# Patient Record
Sex: MALE | Race: BLACK OR AFRICAN AMERICAN | Employment: OTHER | URBAN - METROPOLITAN AREA
[De-identification: names, ages, dates, MRNs, and addresses within clinical notes are randomized per-mention and may not be internally consistent; named-entity substitution may affect disease eponyms.]

---

## 2021-11-27 ENCOUNTER — APPOINTMENT (OUTPATIENT)
Dept: GENERAL RADIOLOGY | Age: 71
DRG: 305 | End: 2021-11-27
Attending: EMERGENCY MEDICINE
Payer: OTHER GOVERNMENT

## 2021-11-27 ENCOUNTER — HOSPITAL ENCOUNTER (INPATIENT)
Age: 71
LOS: 1 days | Discharge: HOME | DRG: 305 | End: 2021-11-28
Attending: EMERGENCY MEDICINE | Admitting: INTERNAL MEDICINE
Payer: OTHER GOVERNMENT

## 2021-11-27 DIAGNOSIS — I16.0 HYPERTENSIVE URGENCY: Primary | ICD-10-CM

## 2021-11-27 DIAGNOSIS — J81.0 ACUTE PULMONARY EDEMA (HCC): ICD-10-CM

## 2021-11-27 PROBLEM — I10 HYPERTENSION: Chronic | Status: ACTIVE | Noted: 2021-11-27

## 2021-11-27 PROBLEM — I16.1 HYPERTENSIVE EMERGENCY: Status: ACTIVE | Noted: 2021-11-27

## 2021-11-27 PROBLEM — E87.70 VOLUME OVERLOAD: Status: ACTIVE | Noted: 2021-11-27

## 2021-11-27 PROBLEM — Z91.199 NONCOMPLIANCE: Chronic | Status: ACTIVE | Noted: 2021-11-27

## 2021-11-27 PROBLEM — F41.9 ANXIETY: Chronic | Status: ACTIVE | Noted: 2021-11-27

## 2021-11-27 PROBLEM — F43.10 PTSD (POST-TRAUMATIC STRESS DISORDER): Chronic | Status: ACTIVE | Noted: 2021-11-27

## 2021-11-27 LAB
ALBUMIN SERPL-MCNC: 3.8 G/DL (ref 3.2–4.6)
ALBUMIN/GLOB SERPL: 1 {RATIO} (ref 1.2–3.5)
ALP SERPL-CCNC: 74 U/L (ref 50–136)
ALT SERPL-CCNC: 37 U/L (ref 12–65)
AMPHET UR QL SCN: NEGATIVE
ANION GAP SERPL CALC-SCNC: 6 MMOL/L (ref 7–16)
APPEARANCE UR: CLEAR
AST SERPL-CCNC: 72 U/L (ref 15–37)
BARBITURATES UR QL SCN: NEGATIVE
BASOPHILS # BLD: 0.1 K/UL (ref 0–0.2)
BASOPHILS NFR BLD: 1 % (ref 0–2)
BENZODIAZ UR QL: NEGATIVE
BILIRUB SERPL-MCNC: 2.2 MG/DL (ref 0.2–1.1)
BILIRUB UR QL: NEGATIVE
BNP SERPL-MCNC: 2790 PG/ML (ref 5–125)
BUN SERPL-MCNC: 26 MG/DL (ref 8–23)
CALCIUM SERPL-MCNC: 9 MG/DL (ref 8.3–10.4)
CANNABINOIDS UR QL SCN: NEGATIVE
CHLORIDE SERPL-SCNC: 110 MMOL/L (ref 98–107)
CO2 SERPL-SCNC: 28 MMOL/L (ref 21–32)
COCAINE UR QL SCN: NEGATIVE
COLOR UR: YELLOW
COVID-19 RAPID TEST, COVR: NOT DETECTED
CREAT SERPL-MCNC: 0.93 MG/DL (ref 0.8–1.5)
DIFFERENTIAL METHOD BLD: ABNORMAL
EOSINOPHIL # BLD: 0.2 K/UL (ref 0–0.8)
EOSINOPHIL NFR BLD: 2 % (ref 0.5–7.8)
ERYTHROCYTE [DISTWIDTH] IN BLOOD BY AUTOMATED COUNT: 14.7 % (ref 11.9–14.6)
GLOBULIN SER CALC-MCNC: 3.9 G/DL (ref 2.3–3.5)
GLUCOSE SERPL-MCNC: 104 MG/DL (ref 65–100)
GLUCOSE UR STRIP.AUTO-MCNC: NEGATIVE MG/DL
HCT VFR BLD AUTO: 42.6 % (ref 41.1–50.3)
HGB BLD-MCNC: 14.1 G/DL (ref 13.6–17.2)
HGB UR QL STRIP: NEGATIVE
IMM GRANULOCYTES # BLD AUTO: 0 K/UL (ref 0–0.5)
IMM GRANULOCYTES NFR BLD AUTO: 0 % (ref 0–5)
KETONES UR QL STRIP.AUTO: NEGATIVE MG/DL
LEUKOCYTE ESTERASE UR QL STRIP.AUTO: NEGATIVE
LYMPHOCYTES # BLD: 1.9 K/UL (ref 0.5–4.6)
LYMPHOCYTES NFR BLD: 17 % (ref 13–44)
MAGNESIUM SERPL-MCNC: 1.9 MG/DL (ref 1.8–2.4)
MCH RBC QN AUTO: 29.3 PG (ref 26.1–32.9)
MCHC RBC AUTO-ENTMCNC: 33.1 G/DL (ref 31.4–35)
MCV RBC AUTO: 88.4 FL (ref 79.6–97.8)
METHADONE UR QL: NEGATIVE
MONOCYTES # BLD: 1.2 K/UL (ref 0.1–1.3)
MONOCYTES NFR BLD: 10 % (ref 4–12)
NEUTS SEG # BLD: 8.3 K/UL (ref 1.7–8.2)
NEUTS SEG NFR BLD: 71 % (ref 43–78)
NITRITE UR QL STRIP.AUTO: NEGATIVE
NRBC # BLD: 0 K/UL (ref 0–0.2)
OPIATES UR QL: NEGATIVE
PCP UR QL: NEGATIVE
PH UR STRIP: 6 [PH] (ref 5–9)
PLATELET # BLD AUTO: 213 K/UL (ref 150–450)
PMV BLD AUTO: 11.7 FL (ref 9.4–12.3)
POTASSIUM SERPL-SCNC: 4.1 MMOL/L (ref 3.5–5.1)
PROT SERPL-MCNC: 7.7 G/DL (ref 6.3–8.2)
PROT UR STRIP-MCNC: NEGATIVE MG/DL
RBC # BLD AUTO: 4.82 M/UL (ref 4.23–5.6)
SODIUM SERPL-SCNC: 144 MMOL/L (ref 136–145)
SOURCE, COVRS: NORMAL
SP GR UR REFRACTOMETRY: 1.01 (ref 1–1.02)
UROBILINOGEN UR QL STRIP.AUTO: 1 EU/DL (ref 0.2–1)
WBC # BLD AUTO: 11.7 K/UL (ref 4.3–11.1)

## 2021-11-27 PROCEDURE — 71045 X-RAY EXAM CHEST 1 VIEW: CPT

## 2021-11-27 PROCEDURE — 74011250637 HC RX REV CODE- 250/637: Performed by: INTERNAL MEDICINE

## 2021-11-27 PROCEDURE — 85025 COMPLETE CBC W/AUTO DIFF WBC: CPT

## 2021-11-27 PROCEDURE — 87635 SARS-COV-2 COVID-19 AMP PRB: CPT

## 2021-11-27 PROCEDURE — 81003 URINALYSIS AUTO W/O SCOPE: CPT

## 2021-11-27 PROCEDURE — 80053 COMPREHEN METABOLIC PANEL: CPT

## 2021-11-27 PROCEDURE — 80307 DRUG TEST PRSMV CHEM ANLYZR: CPT

## 2021-11-27 PROCEDURE — 74011250636 HC RX REV CODE- 250/636: Performed by: INTERNAL MEDICINE

## 2021-11-27 PROCEDURE — 99285 EMERGENCY DEPT VISIT HI MDM: CPT

## 2021-11-27 PROCEDURE — 94762 N-INVAS EAR/PLS OXIMTRY CONT: CPT

## 2021-11-27 PROCEDURE — 83880 ASSAY OF NATRIURETIC PEPTIDE: CPT

## 2021-11-27 PROCEDURE — 94664 DEMO&/EVAL PT USE INHALER: CPT

## 2021-11-27 PROCEDURE — 74011000250 HC RX REV CODE- 250: Performed by: INTERNAL MEDICINE

## 2021-11-27 PROCEDURE — 93005 ELECTROCARDIOGRAM TRACING: CPT | Performed by: EMERGENCY MEDICINE

## 2021-11-27 PROCEDURE — 74011000250 HC RX REV CODE- 250: Performed by: EMERGENCY MEDICINE

## 2021-11-27 PROCEDURE — 65660000000 HC RM CCU STEPDOWN

## 2021-11-27 PROCEDURE — 93005 ELECTROCARDIOGRAM TRACING: CPT | Performed by: INTERNAL MEDICINE

## 2021-11-27 PROCEDURE — 83735 ASSAY OF MAGNESIUM: CPT

## 2021-11-27 PROCEDURE — 96365 THER/PROPH/DIAG IV INF INIT: CPT

## 2021-11-27 PROCEDURE — 96375 TX/PRO/DX INJ NEW DRUG ADDON: CPT

## 2021-11-27 PROCEDURE — 74011250636 HC RX REV CODE- 250/636: Performed by: EMERGENCY MEDICINE

## 2021-11-27 PROCEDURE — 94640 AIRWAY INHALATION TREATMENT: CPT

## 2021-11-27 RX ORDER — ACETAMINOPHEN 650 MG/1
650 SUPPOSITORY RECTAL
Status: DISCONTINUED | OUTPATIENT
Start: 2021-11-27 | End: 2021-11-28 | Stop reason: HOSPADM

## 2021-11-27 RX ORDER — SODIUM CHLORIDE 0.9 % (FLUSH) 0.9 %
5-40 SYRINGE (ML) INJECTION EVERY 8 HOURS
Status: DISCONTINUED | OUTPATIENT
Start: 2021-11-27 | End: 2021-11-28 | Stop reason: HOSPADM

## 2021-11-27 RX ORDER — ALBUTEROL SULFATE 0.83 MG/ML
2.5 SOLUTION RESPIRATORY (INHALATION)
Status: DISCONTINUED | OUTPATIENT
Start: 2021-11-27 | End: 2021-11-28 | Stop reason: HOSPADM

## 2021-11-27 RX ORDER — LABETALOL HYDROCHLORIDE 5 MG/ML
10 INJECTION, SOLUTION INTRAVENOUS
Status: DISCONTINUED | OUTPATIENT
Start: 2021-11-27 | End: 2021-11-27

## 2021-11-27 RX ORDER — ONDANSETRON 4 MG/1
4 TABLET, ORALLY DISINTEGRATING ORAL
Status: DISCONTINUED | OUTPATIENT
Start: 2021-11-27 | End: 2021-11-28 | Stop reason: HOSPADM

## 2021-11-27 RX ORDER — FUROSEMIDE 10 MG/ML
40 INJECTION INTRAMUSCULAR; INTRAVENOUS
Status: COMPLETED | OUTPATIENT
Start: 2021-11-27 | End: 2021-11-27

## 2021-11-27 RX ORDER — ONDANSETRON 2 MG/ML
4 INJECTION INTRAMUSCULAR; INTRAVENOUS
Status: DISCONTINUED | OUTPATIENT
Start: 2021-11-27 | End: 2021-11-28 | Stop reason: HOSPADM

## 2021-11-27 RX ORDER — FUROSEMIDE 10 MG/ML
40 INJECTION INTRAMUSCULAR; INTRAVENOUS EVERY 12 HOURS
Status: DISCONTINUED | OUTPATIENT
Start: 2021-11-28 | End: 2021-11-27

## 2021-11-27 RX ORDER — SODIUM CHLORIDE 0.9 % (FLUSH) 0.9 %
5-10 SYRINGE (ML) INJECTION AS NEEDED
Status: DISCONTINUED | OUTPATIENT
Start: 2021-11-27 | End: 2021-11-28 | Stop reason: HOSPADM

## 2021-11-27 RX ORDER — SODIUM CHLORIDE 0.9 % (FLUSH) 0.9 %
5-40 SYRINGE (ML) INJECTION AS NEEDED
Status: DISCONTINUED | OUTPATIENT
Start: 2021-11-27 | End: 2021-11-28 | Stop reason: HOSPADM

## 2021-11-27 RX ORDER — POLYETHYLENE GLYCOL 3350 17 G/17G
17 POWDER, FOR SOLUTION ORAL DAILY PRN
Status: DISCONTINUED | OUTPATIENT
Start: 2021-11-27 | End: 2021-11-28 | Stop reason: HOSPADM

## 2021-11-27 RX ORDER — AMLODIPINE BESYLATE 10 MG/1
5 TABLET ORAL DAILY
Status: DISCONTINUED | OUTPATIENT
Start: 2021-11-28 | End: 2021-11-27

## 2021-11-27 RX ORDER — SODIUM CHLORIDE 0.9 % (FLUSH) 0.9 %
5-10 SYRINGE (ML) INJECTION EVERY 8 HOURS
Status: DISCONTINUED | OUTPATIENT
Start: 2021-11-27 | End: 2021-11-28 | Stop reason: HOSPADM

## 2021-11-27 RX ORDER — LABETALOL HYDROCHLORIDE 5 MG/ML
10 INJECTION, SOLUTION INTRAVENOUS
Status: DISCONTINUED | OUTPATIENT
Start: 2021-11-27 | End: 2021-11-28 | Stop reason: HOSPADM

## 2021-11-27 RX ORDER — ACETAMINOPHEN 325 MG/1
650 TABLET ORAL
Status: DISCONTINUED | OUTPATIENT
Start: 2021-11-27 | End: 2021-11-28 | Stop reason: HOSPADM

## 2021-11-27 RX ORDER — AMLODIPINE BESYLATE 5 MG/1
5 TABLET ORAL DAILY
Status: DISCONTINUED | OUTPATIENT
Start: 2021-11-27 | End: 2021-11-28 | Stop reason: HOSPADM

## 2021-11-27 RX ADMIN — LABETALOL HYDROCHLORIDE 10 MG: 5 INJECTION INTRAVENOUS at 22:05

## 2021-11-27 RX ADMIN — ALBUTEROL SULFATE 2.5 MG: 2.5 SOLUTION RESPIRATORY (INHALATION) at 20:13

## 2021-11-27 RX ADMIN — SODIUM CHLORIDE 5 MG/HR: 900 INJECTION, SOLUTION INTRAVENOUS at 18:03

## 2021-11-27 RX ADMIN — AMLODIPINE BESYLATE 5 MG: 10 TABLET ORAL at 19:54

## 2021-11-27 RX ADMIN — FUROSEMIDE 40 MG: 10 INJECTION, SOLUTION INTRAMUSCULAR; INTRAVENOUS at 18:56

## 2021-11-27 RX ADMIN — METHYLPREDNISOLONE SODIUM SUCCINATE 40 MG: 40 INJECTION, POWDER, FOR SOLUTION INTRAMUSCULAR; INTRAVENOUS at 19:54

## 2021-11-27 RX ADMIN — Medication 10 ML: at 22:06

## 2021-11-27 RX ADMIN — Medication 10 ML: at 18:57

## 2021-11-27 NOTE — ED PROVIDER NOTES
76-year-old -American male history of hypertension but noncompliant with medication stating he has not had any blood pressure medicine for 5 months. He presents with 2 days of worsening cough and shortness of breath. Does report some chest tightness. Cough is nonproductive. Shortness of breath is aggravated by activity. Patient does smoke but denies history of cardiopulmonary disease. The history is provided by the patient. Cough  Associated symptoms include shortness of breath and wheezing. Pertinent negatives include no chest pain, no headaches, no nausea and no vomiting. No past medical history on file. No past surgical history on file. No family history on file. Social History     Socioeconomic History    Marital status: LEGALLY      Spouse name: Not on file    Number of children: Not on file    Years of education: Not on file    Highest education level: Not on file   Occupational History    Not on file   Tobacco Use    Smoking status: Not on file    Smokeless tobacco: Not on file   Substance and Sexual Activity    Alcohol use: Not on file    Drug use: Not on file    Sexual activity: Not on file   Other Topics Concern    Not on file   Social History Narrative    Not on file     Social Determinants of Health     Financial Resource Strain:     Difficulty of Paying Living Expenses: Not on file   Food Insecurity:     Worried About Running Out of Food in the Last Year: Not on file    Juan Carlos of Food in the Last Year: Not on file   Transportation Needs:     Lack of Transportation (Medical): Not on file    Lack of Transportation (Non-Medical):  Not on file   Physical Activity:     Days of Exercise per Week: Not on file    Minutes of Exercise per Session: Not on file   Stress:     Feeling of Stress : Not on file   Social Connections:     Frequency of Communication with Friends and Family: Not on file    Frequency of Social Gatherings with Friends and Family: Not on file    Attends Yazidism Services: Not on file    Active Member of Clubs or Organizations: Not on file    Attends Club or Organization Meetings: Not on file    Marital Status: Not on file   Intimate Partner Violence:     Fear of Current or Ex-Partner: Not on file    Emotionally Abused: Not on file    Physically Abused: Not on file    Sexually Abused: Not on file   Housing Stability:     Unable to Pay for Housing in the Last Year: Not on file    Number of [place] in the Last Year: Not on file    Unstable Housing in the Last Year: Not on file         ALLERGIES: Patient has no known allergies. Review of Systems   Constitutional: Negative for fever. HENT: Positive for congestion. Respiratory: Positive for cough, shortness of breath and wheezing. Cardiovascular: Positive for leg swelling. Negative for chest pain. Gastrointestinal: Negative for abdominal pain, nausea and vomiting. Genitourinary: Negative for dysuria. Musculoskeletal: Negative for back pain and neck pain. Skin: Negative for rash. Neurological: Negative for headaches. All other systems reviewed and are negative. Vitals:    11/27/21 1726 11/27/21 1730 11/27/21 1803   BP:  (!) 213/142 (!) 175/125   Pulse: 69 (!) 59 (!) 101   Resp: 20 24    Temp: 97.9 °F (36.6 °C) 97.9 °F (36.6 °C)    SpO2: 96% 95%    Weight:  74.8 kg (165 lb)    Height:  6' 1\" (1.854 m)             Physical Exam  Vitals and nursing note reviewed. Constitutional:       Appearance: Normal appearance. He is not toxic-appearing. Comments: Becomes dyspneic with answering questions   HENT:      Head: Normocephalic and atraumatic. Nose: Nose normal.      Mouth/Throat:      Mouth: Mucous membranes are moist.   Eyes:      Conjunctiva/sclera: Conjunctivae normal.      Pupils: Pupils are equal, round, and reactive to light. Cardiovascular:      Rate and Rhythm: Normal rate. Rhythm irregular.       Heart sounds: No murmur heard.      Pulmonary:      Comments: Increased respiratory effort with expiratory wheezes and basilar crackles  Abdominal:      General: There is no distension. Palpations: Abdomen is soft. Tenderness: There is no abdominal tenderness. There is no guarding. Musculoskeletal:         General: Normal range of motion. Cervical back: Normal range of motion and neck supple. Comments: Trace lower extremity edema   Skin:     General: Skin is warm and dry. Neurological:      Mental Status: He is alert and oriented to person, place, and time. Psychiatric:         Mood and Affect: Mood normal.         Behavior: Behavior normal.          MDM  Number of Diagnoses or Management Options  Diagnosis management comments: EKG shows sinus rhythm with sinus arrhythmia frequent PVCs, left atrial enlargement and LVH. X-ray shows cardiomegaly with pulmonary edema. Lab work is unremarkable. Patient was placed on Cardene drip for hypertensive urgency causing symptoms of CHF. Patient is also being treated with Lasix. Hospitalist consulted for admission.        Amount and/or Complexity of Data Reviewed  Clinical lab tests: ordered and reviewed  Tests in the radiology section of CPT®: ordered and reviewed  Tests in the medicine section of CPT®: reviewed and ordered  Discuss the patient with other providers: yes  Independent visualization of images, tracings, or specimens: yes    Risk of Complications, Morbidity, and/or Mortality  Presenting problems: high  Diagnostic procedures: high  Management options: high           EKG    Date/Time: 11/27/2021 6:22 PM  Performed by: Santiago Kennedy MD  Authorized by: Santiago Kennedy MD     ECG reviewed by ED Physician in the absence of a cardiologist: yes    Interpretation:     Interpretation: abnormal    Rate:     ECG rate assessment: normal    Rhythm:     Rhythm: sinus rhythm    Ectopy:     Ectopy: PVCs    QRS:     QRS axis:  Normal  Conduction:     Conduction: normal    ST segments:     ST segments:  Non-specific  T waves:     T waves: non-specific    Other findings:     Other findings: LAE and LVH      Critical Care  Performed by: Jack Amaya MD  Authorized by: Jack Amaya MD     Critical care provider statement:     Critical care time (minutes):  35    Critical care was necessary to treat or prevent imminent or life-threatening deterioration of the following conditions: Hypertensive urgency with CHF.     Critical care was time spent personally by me on the following activities:  Discussions with consultants, examination of patient, evaluation of patient's response to treatment, ordering and performing treatments and interventions, ordering and review of laboratory studies, ordering and review of radiographic studies, pulse oximetry, re-evaluation of patient's condition and review of old charts    I assumed direction of critical care for this patient from another provider in my specialty: no

## 2021-11-27 NOTE — Clinical Note
Status[de-identified] INPATIENT [101]   Type of Bed: Intensive Care [6]   Cardiac Monitoring Required?: Yes   Inpatient Hospitalization Certified Necessary for the Following Reasons: 3.  Patient receiving treatment that can only be provided in an inpatient setting (further clarification in H&P documentation)   Admitting Diagnosis: Hypertensive emergency [[id number]]   Admitting Physician: Morenita Davey [[id number]]   Attending Physician: Morenita Davey [[id number]]   Estimated Length of Stay: 2 Midnights   Discharge Plan[de-identified] Home with Office Follow-up

## 2021-11-27 NOTE — ED TRIAGE NOTES
Pt to ED ambulatory to triage without difficulty and masked. Pt states a cough that started x2 days ago and the coughing is causing his ribs and stomach \"to hurt\". Pt states he has had both covid vaccines. Pt spo2 does drop to the mid 80s on RA during conversation and after ambulating. Pt does get out of breath and needs to take pauses for a deep breath. Pt presents with elevated BP as well. Pt denies hx of COPD but states he still smokes and states smoking \"once every other day\"  Pt states PCP is the [place] and states \"I'm in good health. \"

## 2021-11-28 ENCOUNTER — APPOINTMENT (OUTPATIENT)
Dept: NON INVASIVE DIAGNOSTICS | Age: 71
DRG: 305 | End: 2021-11-28
Attending: INTERNAL MEDICINE
Payer: OTHER GOVERNMENT

## 2021-11-28 VITALS
WEIGHT: 165 LBS | HEART RATE: 91 BPM | BODY MASS INDEX: 21.87 KG/M2 | SYSTOLIC BLOOD PRESSURE: 130 MMHG | TEMPERATURE: 97.9 F | HEIGHT: 73 IN | RESPIRATION RATE: 18 BRPM | OXYGEN SATURATION: 92 % | DIASTOLIC BLOOD PRESSURE: 89 MMHG

## 2021-11-28 LAB
ALBUMIN SERPL-MCNC: 3.5 G/DL (ref 3.2–4.6)
ALBUMIN/GLOB SERPL: 0.8 {RATIO} (ref 1.2–3.5)
ALP SERPL-CCNC: 71 U/L (ref 50–136)
ALT SERPL-CCNC: 34 U/L (ref 12–65)
ANION GAP SERPL CALC-SCNC: 7 MMOL/L (ref 7–16)
AST SERPL-CCNC: 45 U/L (ref 15–37)
ATRIAL RATE: 98 BPM
BASOPHILS # BLD: 0 K/UL (ref 0–0.2)
BASOPHILS NFR BLD: 0 % (ref 0–2)
BILIRUB DIRECT SERPL-MCNC: 0.5 MG/DL
BILIRUB SERPL-MCNC: 2.1 MG/DL (ref 0.2–1.1)
BUN SERPL-MCNC: 22 MG/DL (ref 8–23)
CALCIUM SERPL-MCNC: 9.3 MG/DL (ref 8.3–10.4)
CALCULATED P AXIS, ECG09: 64 DEGREES
CALCULATED R AXIS, ECG10: -22 DEGREES
CALCULATED T AXIS, ECG11: 90 DEGREES
CHLORIDE SERPL-SCNC: 105 MMOL/L (ref 98–107)
CO2 SERPL-SCNC: 28 MMOL/L (ref 21–32)
CREAT SERPL-MCNC: 0.88 MG/DL (ref 0.8–1.5)
DIAGNOSIS, 93000: NORMAL
DIFFERENTIAL METHOD BLD: ABNORMAL
ECHO AO ASC DIAM: 3.5 CM
ECHO AO ROOT DIAM: 3.36 CM
ECHO AV AREA PEAK VELOCITY: 1.44 CM2
ECHO AV AREA PEAK VELOCITY: 1.74 CM2
ECHO AV PEAK GRADIENT: 4.69 MMHG
ECHO AV PEAK VELOCITY: 105.95 CM/S
ECHO EST RA PRESSURE: 15 MMHG
ECHO LA MAJOR AXIS: 4.75 CM
ECHO LA MINOR AXIS: 2.4 CM
ECHO LV EDV A2C: 185.05 ML
ECHO LV EDV A4C: 192.2 ML
ECHO LV EDV BP: 188.85 ML (ref 67–155)
ECHO LV EDV INDEX A4C: 97.1 ML/M2
ECHO LV EDV INDEX BP: 95.4 ML/M2
ECHO LV EDV NDEX A2C: 93.5 ML/M2
ECHO LV EJECTION FRACTION A2C: 46 PERCENT
ECHO LV EJECTION FRACTION A4C: 30 PERCENT
ECHO LV EJECTION FRACTION BIPLANE: 37.6 PERCENT (ref 55–100)
ECHO LV ESV A2C: 99.99 ML
ECHO LV ESV A4C: 135.22 ML
ECHO LV ESV BP: 117.94 ML (ref 22–58)
ECHO LV ESV INDEX A2C: 50.5 ML/M2
ECHO LV ESV INDEX A4C: 68.3 ML/M2
ECHO LV ESV INDEX BP: 59.6 ML/M2
ECHO LV INTERNAL DIMENSION DIASTOLIC: 5.67 CM (ref 4.2–5.9)
ECHO LV INTERNAL DIMENSION SYSTOLIC: 4.56 CM
ECHO LV IVSD: 1.01 CM (ref 0.6–1)
ECHO LV MASS 2D: 243.8 G (ref 88–224)
ECHO LV MASS INDEX 2D: 123.1 G/M2 (ref 49–115)
ECHO LV POSTERIOR WALL DIASTOLIC: 1.12 CM (ref 0.6–1)
ECHO LVOT DIAM: 2.06 CM
ECHO LVOT PEAK GRADIENT: 1.23 MMHG
ECHO LVOT PEAK VELOCITY: 55.38 CM/S
ECHO RIGHT VENTRICULAR SYSTOLIC PRESSURE (RVSP): 42 MMHG
ECHO RV INTERNAL DIMENSION: 2.77 CM
ECHO RV TAPSE: 1.51 CM (ref 1.5–2)
ECHO TV REGURGITANT MAX VELOCITY: 261.35 CM/S
ECHO TV REGURGITANT PEAK GRADIENT: 27.32 MMHG
EOSINOPHIL # BLD: 0 K/UL (ref 0–0.8)
EOSINOPHIL NFR BLD: 0 % (ref 0.5–7.8)
ERYTHROCYTE [DISTWIDTH] IN BLOOD BY AUTOMATED COUNT: 14.5 % (ref 11.9–14.6)
GLOBULIN SER CALC-MCNC: 4.4 G/DL (ref 2.3–3.5)
GLUCOSE SERPL-MCNC: 134 MG/DL (ref 65–100)
HAV IGM SER QL: NONREACTIVE
HBV CORE IGM SER QL: NONREACTIVE
HBV SURFACE AG SER QL: NONREACTIVE
HCT VFR BLD AUTO: 42 % (ref 41.1–50.3)
HCV AB SER QL: NONREACTIVE
HGB BLD-MCNC: 14 G/DL (ref 13.6–17.2)
IMM GRANULOCYTES # BLD AUTO: 0 K/UL (ref 0–0.5)
IMM GRANULOCYTES NFR BLD AUTO: 0 % (ref 0–5)
LYMPHOCYTES # BLD: 0.6 K/UL (ref 0.5–4.6)
LYMPHOCYTES NFR BLD: 7 % (ref 13–44)
MCH RBC QN AUTO: 28.3 PG (ref 26.1–32.9)
MCHC RBC AUTO-ENTMCNC: 33.3 G/DL (ref 31.4–35)
MCV RBC AUTO: 85 FL (ref 79.6–97.8)
MONOCYTES # BLD: 0.1 K/UL (ref 0.1–1.3)
MONOCYTES NFR BLD: 1 % (ref 4–12)
NEUTS SEG # BLD: 7.7 K/UL (ref 1.7–8.2)
NEUTS SEG NFR BLD: 91 % (ref 43–78)
NRBC # BLD: 0 K/UL (ref 0–0.2)
P-R INTERVAL, ECG05: 174 MS
PLATELET # BLD AUTO: 199 K/UL (ref 150–450)
PMV BLD AUTO: 11.7 FL (ref 9.4–12.3)
POTASSIUM SERPL-SCNC: 4.1 MMOL/L (ref 3.5–5.1)
PROT SERPL-MCNC: 7.9 G/DL (ref 6.3–8.2)
Q-T INTERVAL, ECG07: 358 MS
QRS DURATION, ECG06: 104 MS
QTC CALCULATION (BEZET), ECG08: 457 MS
RBC # BLD AUTO: 4.94 M/UL (ref 4.23–5.6)
SODIUM SERPL-SCNC: 140 MMOL/L (ref 136–145)
TROPONIN-HIGH SENSITIVITY: 33.5 PG/ML (ref 0–14)
TROPONIN-HIGH SENSITIVITY: 50.7 PG/ML (ref 0–14)
VENTRICULAR RATE, ECG03: 98 BPM
WBC # BLD AUTO: 8.5 K/UL (ref 4.3–11.1)

## 2021-11-28 PROCEDURE — 85025 COMPLETE CBC W/AUTO DIFF WBC: CPT

## 2021-11-28 PROCEDURE — 97110 THERAPEUTIC EXERCISES: CPT

## 2021-11-28 PROCEDURE — 90471 IMMUNIZATION ADMIN: CPT

## 2021-11-28 PROCEDURE — 80048 BASIC METABOLIC PNL TOTAL CA: CPT

## 2021-11-28 PROCEDURE — 74011250637 HC RX REV CODE- 250/637: Performed by: INTERNAL MEDICINE

## 2021-11-28 PROCEDURE — 74011250636 HC RX REV CODE- 250/636: Performed by: INTERNAL MEDICINE

## 2021-11-28 PROCEDURE — 94664 DEMO&/EVAL PT USE INHALER: CPT

## 2021-11-28 PROCEDURE — 97161 PT EVAL LOW COMPLEX 20 MIN: CPT

## 2021-11-28 PROCEDURE — C8929 TTE W OR WO FOL WCON,DOPPLER: HCPCS

## 2021-11-28 PROCEDURE — 84484 ASSAY OF TROPONIN QUANT: CPT

## 2021-11-28 PROCEDURE — 94640 AIRWAY INHALATION TREATMENT: CPT

## 2021-11-28 PROCEDURE — 74011000250 HC RX REV CODE- 250: Performed by: INTERNAL MEDICINE

## 2021-11-28 PROCEDURE — 36415 COLL VENOUS BLD VENIPUNCTURE: CPT

## 2021-11-28 PROCEDURE — 80074 ACUTE HEPATITIS PANEL: CPT

## 2021-11-28 PROCEDURE — 90686 IIV4 VACC NO PRSV 0.5 ML IM: CPT | Performed by: HOSPITALIST

## 2021-11-28 PROCEDURE — 74011250636 HC RX REV CODE- 250/636: Performed by: FAMILY MEDICINE

## 2021-11-28 PROCEDURE — 74011250636 HC RX REV CODE- 250/636: Performed by: HOSPITALIST

## 2021-11-28 PROCEDURE — 94760 N-INVAS EAR/PLS OXIMETRY 1: CPT

## 2021-11-28 PROCEDURE — 80076 HEPATIC FUNCTION PANEL: CPT

## 2021-11-28 PROCEDURE — 74011000250 HC RX REV CODE- 250: Performed by: FAMILY MEDICINE

## 2021-11-28 RX ORDER — PREDNISONE 20 MG/1
40 TABLET ORAL
Status: DISCONTINUED | OUTPATIENT
Start: 2021-11-29 | End: 2021-11-28 | Stop reason: HOSPADM

## 2021-11-28 RX ORDER — ALBUTEROL SULFATE 90 UG/1
1 AEROSOL, METERED RESPIRATORY (INHALATION)
Qty: 18 G | Refills: 0 | Status: SHIPPED | OUTPATIENT
Start: 2021-11-28 | End: 2021-12-28

## 2021-11-28 RX ORDER — PREDNISONE 20 MG/1
40 TABLET ORAL
Qty: 10 TABLET | Refills: 0 | Status: SHIPPED | OUTPATIENT
Start: 2021-11-29 | End: 2021-12-04

## 2021-11-28 RX ORDER — AMLODIPINE BESYLATE 5 MG/1
10 TABLET ORAL DAILY
Qty: 60 TABLET | Refills: 0 | Status: SHIPPED | OUTPATIENT
Start: 2021-11-29 | End: 2021-12-29

## 2021-11-28 RX ORDER — FUROSEMIDE 40 MG/1
40 TABLET ORAL DAILY
Qty: 30 TABLET | Refills: 0 | Status: SHIPPED | OUTPATIENT
Start: 2021-11-28 | End: 2021-12-28

## 2021-11-28 RX ADMIN — ALBUTEROL SULFATE 2.5 MG: 2.5 SOLUTION RESPIRATORY (INHALATION) at 02:14

## 2021-11-28 RX ADMIN — METHYLPREDNISOLONE SODIUM SUCCINATE 40 MG: 40 INJECTION, POWDER, FOR SOLUTION INTRAMUSCULAR; INTRAVENOUS at 05:25

## 2021-11-28 RX ADMIN — ALBUTEROL SULFATE 2.5 MG: 2.5 SOLUTION RESPIRATORY (INHALATION) at 13:13

## 2021-11-28 RX ADMIN — PERFLUTREN 1 ML: 6.52 INJECTION, SUSPENSION INTRAVENOUS at 11:40

## 2021-11-28 RX ADMIN — Medication 10 ML: at 00:52

## 2021-11-28 RX ADMIN — Medication 5 ML: at 05:25

## 2021-11-28 RX ADMIN — INFLUENZA VIRUS VACCINE 0.5 ML: 15; 15; 15; 15 SUSPENSION INTRAMUSCULAR at 12:04

## 2021-11-28 RX ADMIN — AMLODIPINE BESYLATE 5 MG: 10 TABLET ORAL at 09:02

## 2021-11-28 RX ADMIN — ALBUTEROL SULFATE 2.5 MG: 2.5 SOLUTION RESPIRATORY (INHALATION) at 08:08

## 2021-11-28 NOTE — H&P
Hospitalist History and Physical   Admit Date:  2021  5:39 PM   Name:  Chasity Pacheco   Age:  66 y.o. Sex:  male  :  1950   MRN:  [id number]   Room:  [facility]/    Presenting Complaint: Cough    Reason(s) for Admission: Hypertensive emergency [I16.1]     History of Present Illness:     Chasity Pacheco is a 66 y.o. male with medical history of HTN, anxiety, PTSD who is evaluated with complaints of 2 days shortness of breath. He denies prior COPD or CHF. Smokes 1 pack/ week. Has been out of his antihypertensives for 1 week. Typically followed by [place]. Arrived with /142 and placed on IV cardene drip. S/p 40 mg IV lasix with diuresis. CXR shows cardiac enlargement and mild pulmonary edema. No ECHO available. COVID negative. Review of Systems:  10 systems reviewed and negative except as noted in HPI. - has anxiety, no edema, some chest pain with coughing,  No sputum, no fever, constipated, had rectal bleeding several weeks prior and [facility] , no anorexia      Assessment & Plan:     Principal Problem:    Hypertensive emergency (2021)   Active Problems:    Volume overload (2021)     Noncompliance (2021)    Hypertension (2021)  · Admit to ICU  · Currently on IV cardene to wean as tolerant  · Start norvasc   · Check ECHO  · Continued IV lasix ever 12 hours   · Trend troponin  · Check UDS      Dyspnea, tobacco use:  · Suspected possible COPD  · Add IV solumedrol 40 mg IV every 8 hours  · Scheduled albuterol nebs         Anxiety (2021)     PTSD (post-traumatic stress disorder) (2021)  ·  no current meds       Rectal bleed/ possible hemorrhoids:  · followup CBC  · Holding lovenox  · [facility]  ·         Elevated LFTS:  · Trend lab  · Check hepatitis panel       Leukocytosis:  · followup CBC  · Possible reactive  · Check UA           Dispo/Discharge Planning:     pending    Diet: No diet orders on file  VTE ppx: SCD  Code status: FULL     Hospital Problems as of 11/27/2021 Never Reviewed          Codes Class Noted - Resolved POA    * (Principal) Hypertensive emergency ICD-10-CM: I16.1  ICD-9-CM: 401.9  11/27/2021 - Present Yes        Volume overload ICD-10-CM: E87.70  ICD-9-CM: 276.69  11/27/2021 - Present Yes        Noncompliance (Chronic) ICD-10-CM: Z91.19  ICD-9-CM: V15.81  11/27/2021 - Present Yes        Hypertension (Chronic) ICD-10-CM: I10  ICD-9-CM: 401.9  11/27/2021 - Present Yes        Anxiety (Chronic) ICD-10-CM: F41.9  ICD-9-CM: 300.00  11/27/2021 - Present Yes        PTSD (post-traumatic stress disorder) (Chronic) ICD-10-CM: F43.10  ICD-9-CM: 309.81  11/27/2021 - Present Yes              Past History:   past medical history- HTN, anxiety, PTSD  past surgical history -none  No Known Allergies   Social History     Tobacco Use    Smoking status: yes    Smokeless tobacco:    Substance Use Topics    Alcohol use: No       family history - Cancer   Family history reviewed and negative except as otherwise noted. There is no immunization history on file for this patient. Prior to Admit Medications:  No current outpatient medications    Objective:     Patient Vitals for the past 24 hrs:   Temp Pulse Resp BP SpO2   11/27/21 1902  99 (!) 38 (!) 164/115 93 %   11/27/21 1856  97  (!) 162/103    11/27/21 1821  98 (!) 40 (!) 204/101    11/27/21 1803  (!) 101  (!) 175/125    11/27/21 1730 97.9 °F (36.6 °C) (!) 59 24 (!) 213/142 95 %   11/27/21 1726 97.9 °F (36.6 °C) 69 20  96 %     Oxygen Therapy  O2 Sat (%): 93 % (11/27/21 1902)  Pulse via Oximetry: 106 beats per minute (11/27/21 1902)  O2 Device: None (Room air) (11/27/21 1730)    Estimated body mass index is 21.77 kg/m² as calculated from the following:    Height as of this encounter: 6' 1\" (1.854 m). Weight as of this encounter: 74.8 kg (165 lb).   No intake or output data in the 24 hours ending 11/27/21 1930      Physical Exam:    Blood pressure (!) 164/115, pulse 99, temperature 97.9 °F (36.6 °C), resp. rate (!) 38, height 6' 1\" (1.854 m), weight 74.8 kg (165 lb), SpO2 93 %. General:    Well nourished. No overt distress  Head:  Normocephalic, atraumatic  Eyes:  Sclerae appear normal.  Pupils equally round. ENT:  Nares appear normal, no drainage. Moist oral mucosa  Neck:  No restricted ROM. Trachea midline   CV:   RRR. No m/r/g. No jugular venous distension. No edema  Lungs:   Expiratory wheezing, rhonchi  Abdomen: Bowel sounds present. Soft, nontender, nondistended. Extremities: No cyanosis or clubbing. No edema  Skin:     No rashes and normal coloration. Warm and dry. Neuro:  grossly intact. Psych:  Normal mood and affect. I have reviewed ordered lab tests and independently visualized imaging below:    Last 24hr Labs:  Recent Results (from the past 24 hour(s))   CBC WITH AUTOMATED DIFF    Collection Time: 11/27/21  5:39 PM   Result Value Ref Range    WBC 11.7 (H) 4.3 - 11.1 K/uL    RBC 4.82 4.23 - 5.6 M/uL    HGB 14.1 13.6 - 17.2 g/dL    HCT 42.6 41.1 - 50.3 %    MCV 88.4 79.6 - 97.8 FL    MCH 29.3 26.1 - 32.9 PG    MCHC 33.1 31.4 - 35.0 g/dL    RDW 14.7 (H) 11.9 - 14.6 %    PLATELET 971 262 - 658 K/uL    MPV 11.7 9.4 - 12.3 FL    ABSOLUTE NRBC 0.00 0.0 - 0.2 K/uL    DF AUTOMATED      NEUTROPHILS 71 43 - 78 %    LYMPHOCYTES 17 13 - 44 %    MONOCYTES 10 4.0 - 12.0 %    EOSINOPHILS 2 0.5 - 7.8 %    BASOPHILS 1 0.0 - 2.0 %    IMMATURE GRANULOCYTES 0 0.0 - 5.0 %    ABS. NEUTROPHILS 8.3 (H) 1.7 - 8.2 K/UL    ABS. LYMPHOCYTES 1.9 0.5 - 4.6 K/UL    ABS. MONOCYTES 1.2 0.1 - 1.3 K/UL    ABS. EOSINOPHILS 0.2 0.0 - 0.8 K/UL    ABS. BASOPHILS 0.1 0.0 - 0.2 K/UL    ABS. IMM.  GRANS. 0.0 0.0 - 0.5 K/UL   METABOLIC PANEL, COMPREHENSIVE    Collection Time: 11/27/21  5:39 PM   Result Value Ref Range    Sodium 144 136 - 145 mmol/L    Potassium 4.1 3.5 - 5.1 mmol/L    Chloride 110 (H) 98 - 107 mmol/L    CO2 28 21 - 32 mmol/L    Anion gap 6 (L) 7 - 16 mmol/L    Glucose 104 (H) 65 - 100 mg/dL    BUN 26 (H) 8 - 23 MG/DL    Creatinine 0.93 0.8 - 1.5 MG/DL    GFR est AA >60 >60 ml/min/1.73m2    GFR est non-AA >60 >60 ml/min/1.73m2    Calcium 9.0 8.3 - 10.4 MG/DL    Bilirubin, total 2.2 (H) 0.2 - 1.1 MG/DL    ALT (SGPT) 37 12 - 65 U/L    AST (SGOT) 72 (H) 15 - 37 U/L    Alk. phosphatase 74 50 - 136 U/L    Protein, total 7.7 6.3 - 8.2 g/dL    Albumin 3.8 3.2 - 4.6 g/dL    Globulin 3.9 (H) 2.3 - 3.5 g/dL    A-G Ratio 1.0 (L) 1.2 - 3.5     MAGNESIUM    Collection Time: 11/27/21  5:39 PM   Result Value Ref Range    Magnesium 1.9 1.8 - 2.4 mg/dL   COVID-19 RAPID TEST    Collection Time: 11/27/21  5:39 PM   Result Value Ref Range    Specimen source Nasopharyngeal      COVID-19 rapid test Not detected NOTD     NT-PRO BNP    Collection Time: 11/27/21  5:39 PM   Result Value Ref Range    NT pro-BNP 2,790 (H) 5 - 125 PG/ML       All Micro Results     Procedure Component Value Units Date/Time    COVID-19 RAPID TEST [[id number]] Collected: 11/27/21 1739    Order Status: Completed Specimen: Nasopharyngeal Updated: 11/27/21 1920     Specimen source Nasopharyngeal        COVID-19 rapid test Not detected        Comment:      The specimen is NEGATIVE for SARS-CoV-2, the novel coronavirus associated with COVID-19. A negative result does not rule out COVID-19. This test has been authorized by the FDA under an Emergency Use Authorization (EUA) for use by authorized laboratories. Fact sheet for Healthcare Providers: ConventionUpdate.co.nz  Fact sheet for Patients: ConventionUpdate.co.nz       Methodology: Isothermal Nucleic Acid Amplification               Other Studies:  XR CHEST PORT    Result Date: 11/27/2021  Chest portable CLINICAL INDICATION: Acute worsening severe cough with generalized pleuritic chest pain, epigastric pain, intermittently hypoxia, short of breath after exertion, hypertensive. Smoker.  COMPARISON: None TECHNIQUE: single AP portable view chest at 6:24 PM upright FINDINGS: Cardiac silhouette is enlarged. There is mild diffuse pulmonary vascular congestion and interstitial edema. No pneumothorax, dense consolidation or significant pleural effusion. Well-inflated lungs. 1. Cardiac enlargement, mild pulmonary edema. 2. No consolidation. Medications Administered     furosemide (LASIX) injection 40 mg     Admin Date  11/27/2021 Action  Given Dose  40 mg Route  IntraVENous Administered By  Mary Lou Chandra RN          niCARdipine (CARDENE) 25 mg in 0.9% sodium chloride (MBP/ADV) 250 mL infusion     Admin Date  11/27/2021 Action  New Bag Dose  5 mg/hr Rate  50 mL/hr Route  IntraVENous Administered By  Mary Lou Chandra RN          sodium chloride (NS) flush 5-10 mL     Admin Date  11/27/2021 Action  Given Dose  10 mL Route  IntraVENous Administered By  Mary Lou Chandra RN                Signed:  Charles Elias MD    Part of this note may have been written by using a voice dictation software. The note has been proof read but may still contain some grammatical/other typographical errors.

## 2021-11-28 NOTE — PROGRESS NOTES
Care Management Interventions  Physical Therapy Consult: Yes  Occupational Therapy Consult: No  Speech Therapy Consult: No  Support Systems: Other Family Member(s)  Confirm Follow Up Transport: Self  Freedom of Choice List was Provided with Basic Dialogue that Supports the Patient's Individualized Plan of Care/Goals, Treatment Preferences and Shares the Quality Data Associated with the Providers?: Yes   Resource Information Provided?: No  Discharge Location  Discharge Placement: Home  Patient is alert and oriented in all spheres. Lives alone. Patient has 3 sisters that are very supportive. Patient is independent with ambulation and ADLs. Drives. Confirmed patient has a pcp. DME: none  No history of HH or rehab. CM is not anticipating any discharge needs but remains available.

## 2021-11-28 NOTE — PROGRESS NOTES
TRANSFER - IN REPORT:    Verbal report received from Zuri RN (name) on [facility]  being received from ED(unit) for routine progression of care      Report consisted of patients Situation, Background, Assessment and   Recommendations(SBAR). Information from the following report(s) SBAR was reviewed with the receiving nurse. Opportunity for questions and clarification was provided. Assessment completed upon patients arrival to unit and care assumed.

## 2021-11-28 NOTE — PROGRESS NOTES
Received report from Rodrigue Romero, [id number] on patient watching tv in bed. Patient denies any SOB at rest, sometimes on exertion. Currently on room air. Denies pain. Tele monitor in place and working. No apparent distress noted. Spoke with patient and he would like PT to see him before he leaves the hospital for his left leg. Spoke with Dr. Segundo White regarding the plan - potential discharge after the ECHO today. Call light and belongings within reach. Will monitor.

## 2021-11-28 NOTE — PROGRESS NOTES
Problem: Falls - Risk of  Goal: *Absence of Falls  Description: Document Wendy Moeller Fall Risk and appropriate interventions in the flowsheet.   Outcome: Progressing Towards Goal  Note: Fall Risk Interventions:  Mobility Interventions: Bed/chair exit alarm         Medication Interventions: Evaluate medications/consider consulting pharmacy         History of Falls Interventions: Bed/chair exit alarm

## 2021-11-28 NOTE — ED NOTES
TRANSFER - OUT REPORT:    Verbal report given to [facility] RN  on Jhon Santoro  being transferred to room 825 for routine progression of care       Report consisted of patients Situation, Background, Assessment and   Recommendations(SBAR). Information from the following report(s) SBAR was reviewed with the receiving nurse. Lines:   Peripheral IV 11/27/21 Left Antecubital (Active)        Opportunity for questions and clarification was provided.       Patient transported with:   Registered Nurse

## 2021-11-28 NOTE — PROGRESS NOTES
Called and [facility] for ECHO tech that this patient is pending discharge after ECHO performed today. Awaiting call back.

## 2021-11-28 NOTE — PROGRESS NOTES
Care Management Interventions  Physical Therapy Consult: Yes  Occupational Therapy Consult: No  Speech Therapy Consult: No  Support Systems: Other Family Member(s)  Confirm Follow Up Transport: Self  Freedom of Choice List was Provided with Basic Dialogue that Supports the Patient's Individualized Plan of Care/Goals, Treatment Preferences and Shares the Quality Data Associated with the Providers?: Yes   Resource Information Provided?: No  Discharge Location  Discharge Placement: Home  Patient is discharging home. CM did not identify any discharge needs.

## 2021-11-28 NOTE — PROGRESS NOTES
Pt arrived to floor at 2309. Respirations are even and unlabored on RA. No signs of distress noted or needs expressed. Vital signs stable. Admission assessment completed. Dual Skin assessment completed with Luba Mondragon RN. Call light within reach. Will continue to monitor.

## 2021-11-28 NOTE — PROGRESS NOTES
[facility] GOALS:  (Developed with and agreed upon by patient and/or caregiver.)  No goals    PHYSICAL THERAPY ASSESSMENT: Initial Assessment, Daily Note, Discharge and AM PT Treatment Day # 1      Ulices Araujo is a 66 y.o. male   PRIMARY DIAGNOSIS: Hypertensive emergency  Hypertensive emergency [I16.1]       Reason for Referral:    ICD-10: Treatment Diagnosis: Generalized Muscle Weakness (M62.81)  Difficulty in walking, Not elsewhere classified (R26.2)  INPATIENT: Payor: [facility] / Plan: Logan Rutledge / Product Type: Federal Funded Programs /     ASSESSMENT:     REHAB RECOMMENDATIONS:   Recommendation to date pending progress:  Setting:   No further skilled therapy   Equipment:    None     PRIOR LEVEL OF FUNCTION:  (Prior to Hospitalization) INITIAL/CURRENT LEVEL OF FUNCTION:  (Most Recently Demonstrated)   Bed Mobility:   Independent  Sit to Stand:   Independent  Transfers:   Modified Independent  Gait/Mobility:   Modified Independent Bed Mobility:   Independent  Sit to Stand:   Independent  Transfers:   Modified Independent  Gait/Mobility:   Modified Independent     ASSESSMENT:  Mr. Livia Buckner" asked nursing to consult PT because he has a leg that gives out on him. He was admitted for hypertension. After evaluation determined that Aleida Go has long standing weakness in his left hip flexor accompanied by some pain in his hip and thigh. He tells me he has been given exercises and was able to go to the gym at his apartment but it has been closed due to covid. We reviewed the exercises that he can do outside the gym and encouraged him to use his cane or walker as needed to keep from falling. Also suggested that when he have his physical at the [place] in February that he let them know. Maybe some imaging should be done. At this time he does not require further skilled PT.     SUBJECTIVE:   Mr. Jaja Manuel states, \"I go by edith. \"    SOCIAL HISTORY/LIVING ENVIRONMENT:   Home Environment: Apartment  One/Two Story Residence: Other (Comment) (3rd floor)  Living Alone: Yes  Support Systems: Other (Comment) ([employer])  OBJECTIVE:     PAIN: VITAL SIGNS: LINES/DRAINS:   Pre Treatment: Pain Screen  Pain Scale 1: Numeric (0 - 10)  Pain Intensity 1: 0  Post Treatment: 0     O2 Device: None (Room air)     GROSS EVALUATION:   Within Functional Limits Abnormal/ Functional Abnormal/ Non-Functional (see comments) Not Tested Comments:   AROM [] [x] [] [] Limited hip flexion on left   PROM [] [] [] []    Strength [] [x] [] [] 2/5 hip flexion on left   Balance [] [] [] []    Posture [] [] [] []    Sensation [x] [] [] []    Coordination [] [] [] []    Tone [] [] [] []    Edema [] [] [] []    Activity Tolerance [x] [] [] []     [] [] [] []      COGNITION/  PERCEPTION: Intact Impaired   (see comments) Comments:   Orientation [] []    Vision [x] []    Hearing [x] []    Command Following [x] []    Safety Awareness [x] []     [] []      MOBILITY: I Mod I S SBA CGA Min Mod Max Total  NT x2 Comments:   Bed Mobility    Rolling [x] [] [] [] [] [] [] [] [] [] []    Supine to Sit [x] [] [] [] [] [] [] [] [] [] []    Scooting [x] [] [] [] [] [] [] [] [] [] []    Sit to Supine [x] [] [] [] [] [] [] [] [] [] []    Transfers    Sit to Stand [x] [] [] [] [] [] [] [] [] [] []    Bed to Chair [x] [] [] [] [] [] [] [] [] [] []    Stand to Sit [x] [] [] [] [] [] [] [] [] [] []    I=Independent, Mod I=Modified Independent, S=Supervision, SBA=Standby Assistance, CGA=Contact Guard Assistance,   Min=Minimal Assistance, Mod=Moderate Assistance, Max=Maximal Assistance, Total=Total Assistance, NT=Not Tested  GAIT: I Mod I S SBA CGA Min Mod Max Total  NT x2 Comments:   Level of Assistance [] [x] [] [] [] [] [] [] [] [] []    Distance Around the room    DME SPC    Gait Quality     Weightbearing Status      I=Independent, Mod I=Modified Independent, S=Supervision, SBA=Standby Assistance, CGA=Contact Guard Assistance,   Min=Minimal Assistance, Mod=Moderate Assistance, Max=Maximal Assistance, Total=Total Assistance, NT=Not Tested    [facility] Form       How much difficulty does the patient currently have. .. Unable A Lot A Little None   1. Turning over in bed (including adjusting bedclothes, sheets and blankets)? [] 1   [] 2   [] 3   [x] 4   2. Sitting down on and standing up from a chair with arms ( e.g., wheelchair, bedside commode, etc.)   [] 1   [] 2   [] 3   [x] 4   3. Moving from lying on back to sitting on the side of the bed? [] 1   [] 2   [] 3   [x] 4   How much help from another person does the patient currently need. .. Total A Lot A Little None   4. Moving to and from a bed to a chair (including a wheelchair)? [] 1   [] 2   [] 3   [x] 4   5. Need to walk in hospital room? [] 1   [] 2   [x] 3   [] 4   6. Climbing 3-5 steps with a railing? [] 1   [] 2   [x] 3   [] 4   © 2007, Trustees of [facility] [employer], under license to [employer]. All rights reserved     Score:  Initial: 22 Most Recent: X (Date: -- )    Interpretation of Tool:  Represents activities that are increasingly more difficult (i.e. Bed mobility, Transfers, Gait). PLAN:   FREQUENCY/DURATION:   for duration of hospital stay or until stated goals are met, whichever comes first.    PROBLEM LIST:   (Skilled intervention is medically necessary to address:)  1. NA   INTERVENTIONS PLANNED:   (Benefits and precautions of physical therapy have been discussed with the patient.)  1. NA     TREATMENT:     EVALUATION: Low Complexity : (Untimed Charge)    TREATMENT:   ($$ Therapeutic Exercises: 8-22 mins    )  Therapeutic Exercise (15 Minutes): Therapeutic exercises noted below to improve functional AROM, strength and mobility.      TREATMENT GRID:  N/A    AFTER TREATMENT POSITION/PRECAUTIONS:  Bed    INTERDISCIPLINARY COLLABORATION:  RN/PCT and PT/PTA    TOTAL TREATMENT DURATION:  PT Patient Time In/Time Out  Time In: 0930  Time Out: [address] [place], PT

## 2021-11-28 NOTE — DISCHARGE SUMMARY
Hospitalist Discharge Summary     Admit Date:  2021  5:39 PM   DC note date: 2021  Name:  Sue Schumacher   Age:  66 y.o.  :  1950   MRN:  [id number]   PCP:  Julio Garcia MD  Treatment Team: Attending Provider: Franny Maradiaga MD; Primary Nurse: Zeynep Winters, RN; Physical Therapist: Laine Cramer PT; Care Manager: Yan Colon Problem List for this Hospitalization:  Hospital Problems as of 2021 Never Reviewed          Codes Class Noted - Resolved POA    * (Principal) Hypertensive emergency ICD-10-CM: I16.1  ICD-9-CM: 401.9  2021 - Present Yes        Volume overload ICD-10-CM: E87.70  ICD-9-CM: 276.69  2021 - Present Yes        Noncompliance (Chronic) ICD-10-CM: Z91.19  ICD-9-CM: V15.81  2021 - Present Yes        Hypertension (Chronic) ICD-10-CM: I10  ICD-9-CM: 401.9  2021 - Present Yes        Anxiety (Chronic) ICD-10-CM: F41.9  ICD-9-CM: 300.00  2021 - Present Yes        PTSD (post-traumatic stress disorder) (Chronic) ICD-10-CM: F43.10  ICD-9-CM: 309.81  2021 - Present Yes            Admission HPI from 2021:    \"Kostas Johnson is a 66 y.o. male with medical history of HTN, anxiety, PTSD who is evaluated with complaints of 2 days shortness of breath. He denies prior COPD or CHF. Smokes 1 pack/ week. Has been out of his antihypertensives for 1 week. Typically followed by [facility]. Arrived with /142 and placed on IV cardene drip. S/p 40 mg IV lasix with diuresis. CXR shows cardiac enlargement and mild pulmonary edema. No ECHO available. COVID negative. Sue Schumacher is a 66 y.o. male with medical history of HTN, anxiety, PTSD who is evaluated with complaints of 2 days shortness of breath. He denies prior COPD or CHF. Smokes 1 pack/ week. Has been out of his antihypertensives for 1 week. Typically followed by [facility]. Arrived with /142 and placed on IV cardene drip. S/p 40 mg IV lasix with diuresis.  CXR shows cardiac enlargement and mild pulmonary edema. No ECHO available. COVID negative. \"    Hospital Course:  Patient admitted with hypertensive urgency and pulmonary edema. Chest x-ray with mild pulmonary edema. Patient initially required Cardene gtt. and later transitioned to p.o. Norvasc. Blood pressure improved significantly. He was started on Lasix, good urine output with negative fluid balance. Echocardiogram completed, pending read. Patient also started on IV Solu-Medrol and later transitioned to p.o. prednisone for possible COPD exacerbation. Patient requested to discharge. Symptoms have been significantly improved. Patient is stable to discharge home today with close follow-up with PCP. Disposition: Home or Self Care  Activity: Activity as tolerated  Diet: ADULT DIET Regular; Low Fat/Low Chol/High Fiber/DANETTE  Code Status: Full Code    Follow Up Orders: Follow-up Appointments   Procedures    FOLLOW UP VISIT Appointment in: 3 - 5 Days PCP     PCP     Standing Status:   Standing     Number of Occurrences:   1     Order Specific Question:   Appointment in     Answer:   3 - 5 Days       Follow-up Information     Follow up With Specialties Details Why Contact Info    Other, MD Nina    Patient can only remember the practice name and not the physician            Discharge meds at bottom of this note. Plan was discussed with patient. All questions answered. Patient was stable at time of discharge. Given instructions to call a physician or return if any concerns. Discharge summary and encounter summary was sent to PCP electronically via \"Comm Mgt\" link in [facility], if possible. Diagnostic Imaging/Tests:   XR CHEST PORT    Result Date: 11/27/2021  Chest portable CLINICAL INDICATION: Acute worsening severe cough with generalized pleuritic chest pain, epigastric pain, intermittently hypoxia, short of breath after exertion, hypertensive. Smoker.  COMPARISON: None TECHNIQUE: single AP portable view chest at 6:24 PM upright FINDINGS: Cardiac silhouette is enlarged. There is mild diffuse pulmonary vascular congestion and interstitial edema. No pneumothorax, dense consolidation or significant pleural effusion. Well-inflated lungs. 1. Cardiac enlargement, mild pulmonary edema. 2. No consolidation. Echocardiogram results:  No results found for this visit on 11/27/21. Procedures done this admission:  * No surgery found *    All Micro Results     Procedure Component Value Units Date/Time    COVID-19 RAPID TEST [[id number]] Collected: 11/27/21 1739    Order Status: Completed Specimen: Nasopharyngeal Updated: 11/27/21 1920     Specimen source Nasopharyngeal        COVID-19 rapid test Not detected        Comment:      The specimen is NEGATIVE for SARS-CoV-2, the novel coronavirus associated with COVID-19. A negative result does not rule out COVID-19. This test has been authorized by the FDA under an Emergency Use Authorization (EUA) for use by authorized laboratories.         Fact sheet for Healthcare Providers: ConventionUpdate.co.nz  Fact sheet for Patients: ConventionUpdate.co.nz       Methodology: Isothermal Nucleic Acid Amplification               [unfilled]    Labs: Results:       BMP, Mg, Phos Recent Labs     11/28/21  0707 11/27/21  1739    144   K 4.1 4.1    110*   CO2 28 28   AGAP 7 6*   BUN 22 26*   CREA 0.88 0.93   CA 9.3 9.0   * 104*   MG  --  1.9      CBC Recent Labs     11/28/21  0707 11/27/21  1739   WBC 8.5 11.7*   RBC 4.94 4.82   HGB 14.0 14.1   HCT 42.0 42.6    213   GRANS 91* 71   LYMPH 7* 17   EOS 0* 2   MONOS 1* 10   BASOS 0 1   IG 0 0   ANEU 7.7 8.3*   ABL 0.6 1.9   LENY 0.0 0.2   ABM 0.1 1.2   ABB 0.0 0.1   AIG 0.0 0.0      LFT Recent Labs     11/28/21  0707 11/27/21  1739   ALT 34 37   AP 71 74   TP 7.9 7.7   ALB 3.5 3.8   GLOB 4.4* 3.9*   AGRAT 0.8* 1.0*      Cardiac Testing No results found for: BNPP, BNP, CPK, RCK1, RCK2, RCK3, RCK4, CKMB, CKNDX, CKND1, TROPT, TROIQ   Coagulation Tests No results found for: PTP, INR, APTT, INREXT, INREXT   A1c No results found for: HBA1C, KUM8DNNI, UCL4EFNB   Lipid Panel No results found for: CHOL, CHOLPOCT, CHOLX, CHLST, CHOLV, 549801, HDL, HDLP, LDL, LDLC, DLDLP, 431657, VLDLC, VLDL, TGLX, TRIGL, TRIGP, TGLPOCT, CHHD, CHHDX   Thyroid Panel No results found for: TSH, T4, FT4, TT3, T3U, TSHEXT, TSHEXT     Most Recent UA Lab Results   Component Value Date/Time    Color YELLOW 11/27/2021 07:54 PM    Appearance CLEAR 11/27/2021 07:54 PM    pH (UA) 6.0 11/27/2021 07:54 PM    Protein Negative 11/27/2021 07:54 PM    Glucose Negative 11/27/2021 07:54 PM    Ketone Negative 11/27/2021 07:54 PM    Bilirubin Negative 11/27/2021 07:54 PM    Blood Negative 11/27/2021 07:54 PM    Urobilinogen 1.0 11/27/2021 07:54 PM    Nitrites Negative 11/27/2021 07:54 PM    Leukocyte Esterase Negative 11/27/2021 07:54 PM        No Known Allergies  Immunization History   Administered Date(s) Administered    Influenza Vaccine AnyCloud) PF (>6 Mo Flulaval, Fluarix, and >3 Yrs Afluria, Fluzone 19411) 11/28/2021       All Labs from Last 24 Hrs:  Recent Results (from the past 24 hour(s))   CBC WITH AUTOMATED DIFF    Collection Time: 11/27/21  5:39 PM   Result Value Ref Range    WBC 11.7 (H) 4.3 - 11.1 K/uL    RBC 4.82 4.23 - 5.6 M/uL    HGB 14.1 13.6 - 17.2 g/dL    HCT 42.6 41.1 - 50.3 %    MCV 88.4 79.6 - 97.8 FL    MCH 29.3 26.1 - 32.9 PG    MCHC 33.1 31.4 - 35.0 g/dL    RDW 14.7 (H) 11.9 - 14.6 %    PLATELET 471 255 - 749 K/uL    MPV 11.7 9.4 - 12.3 FL    ABSOLUTE NRBC 0.00 0.0 - 0.2 K/uL    DF AUTOMATED      NEUTROPHILS 71 43 - 78 %    LYMPHOCYTES 17 13 - 44 %    MONOCYTES 10 4.0 - 12.0 %    EOSINOPHILS 2 0.5 - 7.8 %    BASOPHILS 1 0.0 - 2.0 %    IMMATURE GRANULOCYTES 0 0.0 - 5.0 %    ABS. NEUTROPHILS 8.3 (H) 1.7 - 8.2 K/UL    ABS. LYMPHOCYTES 1.9 0.5 - 4.6 K/UL    ABS. MONOCYTES 1.2 0.1 - 1.3 K/UL    ABS. EOSINOPHILS 0.2 0.0 - 0.8 K/UL    ABS. BASOPHILS 0.1 0.0 - 0.2 K/UL    ABS. IMM. GRANS. 0.0 0.0 - 0.5 K/UL   METABOLIC PANEL, COMPREHENSIVE    Collection Time: 11/27/21  5:39 PM   Result Value Ref Range    Sodium 144 136 - 145 mmol/L    Potassium 4.1 3.5 - 5.1 mmol/L    Chloride 110 (H) 98 - 107 mmol/L    CO2 28 21 - 32 mmol/L    Anion gap 6 (L) 7 - 16 mmol/L    Glucose 104 (H) 65 - 100 mg/dL    BUN 26 (H) 8 - 23 MG/DL    Creatinine 0.93 0.8 - 1.5 MG/DL    GFR est AA >60 >60 ml/min/1.73m2    GFR est non-AA >60 >60 ml/min/1.73m2    Calcium 9.0 8.3 - 10.4 MG/DL    Bilirubin, total 2.2 (H) 0.2 - 1.1 MG/DL    ALT (SGPT) 37 12 - 65 U/L    AST (SGOT) 72 (H) 15 - 37 U/L    Alk.  phosphatase 74 50 - 136 U/L    Protein, total 7.7 6.3 - 8.2 g/dL    Albumin 3.8 3.2 - 4.6 g/dL    Globulin 3.9 (H) 2.3 - 3.5 g/dL    A-G Ratio 1.0 (L) 1.2 - 3.5     MAGNESIUM    Collection Time: 11/27/21  5:39 PM   Result Value Ref Range    Magnesium 1.9 1.8 - 2.4 mg/dL   COVID-19 RAPID TEST    Collection Time: 11/27/21  5:39 PM   Result Value Ref Range    Specimen source Nasopharyngeal      COVID-19 rapid test Not detected NOTD     NT-PRO BNP    Collection Time: 11/27/21  5:39 PM   Result Value Ref Range    NT pro-BNP 2,790 (H) 5 - 125 PG/ML   EKG, 12 LEAD, INITIAL    Collection Time: 11/27/21  5:50 PM   Result Value Ref Range    Ventricular Rate 98 BPM    Atrial Rate 98 BPM    P-R Interval 174 ms    QRS Duration 104 ms    Q-T Interval 358 ms    QTC Calculation (Bezet) 457 ms    Calculated P Axis 64 degrees    Calculated R Axis -22 degrees    Calculated T Axis 90 degrees    Diagnosis       Sinus rhythm with marked sinus arrhythmia with frequent Premature ventricular   complexes  Possible Left atrial enlargement  Left ventricular hypertrophy with repolarization abnormality  When compared with ECG of 27-NOV-2021 17:46,  No significant change was found  Confirmed by Alden Potter MD, Rula Frances ([id number]) on 11/28/2021 7:57:31 AM     URINALYSIS W/ RFLX MICROSCOPIC    Collection Time: 11/27/21  7:54 PM   Result Value Ref Range    Color YELLOW      Appearance CLEAR      Specific gravity 1.006 1.001 - 1.023      pH (UA) 6.0 5.0 - 9.0      Protein Negative NEG mg/dL    Glucose Negative mg/dL    Ketone Negative NEG mg/dL    Bilirubin Negative NEG      Blood Negative NEG      Urobilinogen 1.0 0.2 - 1.0 EU/dL    Nitrites Negative NEG      Leukocyte Esterase Negative NEG     DRUG SCREEN, URINE    Collection Time: 11/27/21  7:54 PM   Result Value Ref Range    PCP(PHENCYCLIDINE) Negative      BENZODIAZEPINES Negative      COCAINE Negative      AMPHETAMINES Negative      METHADONE Negative      THC (TH-CANNABINOL) Negative      OPIATES Negative      BARBITURATES Negative     TROPONIN-HIGH SENSITIVITY    Collection Time: 11/28/21 12:06 AM   Result Value Ref Range    Troponin-High Sensitivity 50.7 (H) 0 - 14 pg/mL   METABOLIC PANEL, BASIC    Collection Time: 11/28/21  7:07 AM   Result Value Ref Range    Sodium 140 136 - 145 mmol/L    Potassium 4.1 3.5 - 5.1 mmol/L    Chloride 105 98 - 107 mmol/L    CO2 28 21 - 32 mmol/L    Anion gap 7 7 - 16 mmol/L    Glucose 134 (H) 65 - 100 mg/dL    BUN 22 8 - 23 MG/DL    Creatinine 0.88 0.8 - 1.5 MG/DL    GFR est AA >60 >60 ml/min/1.73m2    GFR est non-AA >60 >60 ml/min/1.73m2    Calcium 9.3 8.3 - 10.4 MG/DL   CBC WITH AUTOMATED DIFF    Collection Time: 11/28/21  7:07 AM   Result Value Ref Range    WBC 8.5 4.3 - 11.1 K/uL    RBC 4.94 4.23 - 5.6 M/uL    HGB 14.0 13.6 - 17.2 g/dL    HCT 42.0 41.1 - 50.3 %    MCV 85.0 79.6 - 97.8 FL    MCH 28.3 26.1 - 32.9 PG    MCHC 33.3 31.4 - 35.0 g/dL    RDW 14.5 11.9 - 14.6 %    PLATELET 590 022 - 322 K/uL    MPV 11.7 9.4 - 12.3 FL    ABSOLUTE NRBC 0.00 0.0 - 0.2 K/uL    DF AUTOMATED      NEUTROPHILS 91 (H) 43 - 78 %    LYMPHOCYTES 7 (L) 13 - 44 %    MONOCYTES 1 (L) 4.0 - 12.0 %    EOSINOPHILS 0 (L) 0.5 - 7.8 %    BASOPHILS 0 0.0 - 2.0 %    IMMATURE GRANULOCYTES 0 0.0 - 5.0 %    ABS. NEUTROPHILS 7.7 1.7 - 8.2 K/UL    ABS.  LYMPHOCYTES 0.6 0.5 - 4.6 K/UL    ABS. MONOCYTES 0.1 0.1 - 1.3 K/UL    ABS. EOSINOPHILS 0.0 0.0 - 0.8 K/UL    ABS. BASOPHILS 0.0 0.0 - 0.2 K/UL    ABS. IMM. GRANS. 0.0 0.0 - 0.5 K/UL   HEPATIC FUNCTION PANEL    Collection Time: 11/28/21  7:07 AM   Result Value Ref Range    Protein, total 7.9 6.3 - 8.2 g/dL    Albumin 3.5 3.2 - 4.6 g/dL    Globulin 4.4 (H) 2.3 - 3.5 g/dL    A-G Ratio 0.8 (L) 1.2 - 3.5      Bilirubin, total 2.1 (H) 0.2 - 1.1 MG/DL    Bilirubin, direct 0.5 (H) <0.4 MG/DL    Alk.  phosphatase 71 50 - 136 U/L    AST (SGOT) 45 (H) 15 - 37 U/L    ALT (SGPT) 34 12 - 65 U/L   HEPATITIS PANEL, ACUTE    Collection Time: 11/28/21  7:07 AM   Result Value Ref Range    Hepatitis A, IgM NONREACTIVE NR      Hepatitis B core, IgM NONREACTIVE NR      Hep B Surface Ag NONREACTIVE NR      Hepatitis C virus Ab NONREACTIVE NR     TROPONIN-HIGH SENSITIVITY    Collection Time: 11/28/21  7:07 AM   Result Value Ref Range    Troponin-High Sensitivity 33.5 (H) 0 - 14 pg/mL   ECHO ADULT COMPLETE    Collection Time: 11/28/21 11:50 AM   Result Value Ref Range    IVSd 1.01 (A) 0.6 - 1.0 cm    LVIDd 5.67 4.2 - 5.9 cm    LVIDs 4.56 cm    LVOT d 2.06 cm    LVPWd 1.12 (A) 0.6 - 1.0 cm    BP EF 37.6 (A) 55 - 100 percent    LV Ejection Fraction MOD 2C 46 percent    LV Ejection Fraction MOD 4C 30 percent    LV ED Vol A2C 185.05 mL    LV ED Vol A4C 192.20 mL    LV ED Vol .85 (A) 67 - 155 mL    LV ES Vol A2C 99.99 mL    LV ES Vol A4C 135.22 mL    LV ES Vol .94 (A) 22 - 58 mL    LVOT Peak Gradient 1.23 mmHg    LVOT Peak Velocity 55.38 cm/s    RVIDd 2.77 cm    Left Atrium Major Axis 4.75 cm    Aortic Valve Area by Continuity of Peak Velocity 1.74 cm2    Aortic Valve Area by Continuity of Peak Velocity 1.44 cm2    AoV PG 4.69 mmHg    Aortic Valve Systolic Peak Velocity 820.77 cm/s    Tapse 1.51 1.5 - 2.0 cm    Triscuspid Valve Regurgitation Peak Gradient 27.32 mmHg    TR Max Velocity 261.35 cm/s    AO ASC D 3.50 cm    Ao Root D 3.36 cm       Discharge Exam:  Patient Vitals for the past 24 hrs:   Temp Pulse Resp BP SpO2   11/28/21 1229    130/89    11/28/21 1200  91      11/28/21 1119 97.9 °F (36.6 °C) 90 18 130/89 92 %   11/28/21 0824 97.9 °F (36.6 °C) 73 20 (!) 168/89 95 %   11/28/21 0809     100 %   11/28/21 0800  92      11/28/21 0403 97.6 °F (36.4 °C) 73 20 (!) 137/96 97 %   11/28/21 0400  83      11/28/21 0000  79      11/27/21 2318 97.9 °F (36.6 °C) 80 22 (!) 126/55 95 %   11/27/21 2232  82 24 (!) 151/86    11/27/21 2218  78 (!) 31 137/77    11/27/21 2205  95  (!) 187/72    11/27/21 2200  100  (!) 187/72    11/27/21 2145  95 30 (!) 178/92    11/27/21 2130  94  (!) 167/79    11/27/21 2115  92 (!) 109 (!) 177/84    11/27/21 2107  (!) 106 20 (!) 169/74    11/27/21 2100  96 20 (!) 158/84    11/27/21 2052  91  121/84    11/27/21 2048  99 20 121/84    11/27/21 2045  99 24     11/27/21 2030  93 (!) 137     11/27/21 2015  97      11/27/21 2013     95 %   11/27/21 2003  (!) 104 20 (!) 196/99    11/27/21 2000  98 (!) 115     11/27/21 1945  100 (!) 67     11/27/21 1942  97 20 (!) 181/109    11/27/21 1930  (!) 105 (!) 53     11/27/21 1922  (!) 112 20 (!) 155/125    11/27/21 1915  (!) 116      11/27/21 1902  99 (!) 38 (!) 164/115 93 %   11/27/21 1900  (!) 103 (!) 53  93 %   11/27/21 1856  97  (!) 162/103    11/27/21 1821  98 (!) 40 (!) 204/101    11/27/21 1803  (!) 101  (!) 175/125    11/27/21 1730 97.9 °F (36.6 °C) (!) 59 24 (!) 213/142 95 %   11/27/21 1726 97.9 °F (36.6 °C) 69 20  96 %     Oxygen Therapy  O2 Sat (%): 92 % (11/28/21 1119)  Pulse via Oximetry: 80 beats per minute (11/28/21 0809)  O2 Device: None (Room air) (11/28/21 0809)    Estimated body mass index is 21.77 kg/m² as calculated from the following:    Height as of this encounter: 6' 1\" (1.854 m). Weight as of this encounter: 74.8 kg (165 lb).       Intake/Output Summary (Last 24 hours) at 11/28/2021 [address] filed at 11/28/2021 1000  Gross per 24 hour   Intake 360 ml   Output 1150 ml   Net -790 ml       *Note that automatically entered I/Os may not be accurate; dependent on patient compliance with collection and accurate  by assistants. General:    Well nourished. Alert. Eyes:   Normal sclerae. Extraocular movements intact. ENT:  Normocephalic, atraumatic. Moist mucous membranes  CV:   Regular rate and rhythm. No murmur, rub, or gallop. Lungs:  Clear to auscultation bilaterally. No wheezing, rhonchi, or rales. Abdomen: Soft, nontender, nondistended. Extremities: Warm and dry. No cyanosis or edema. Neurologic: CN II-XII grossly intact. No gross focal deficits   Skin:     No rashes or jaundice. Psych:  Normal mood and affect.     Current Med List in Hospital:   Current Facility-Administered Medications   Medication Dose Route Frequency    [START ON 11/29/2021] predniSONE (DELTASONE) tablet 40 mg  40 mg Oral DAILY WITH BREAKFAST    perflutren lipid microspheres (DEFINITY) in NS bolus IV  1 mL IntraVENous PRN    sodium chloride (NS) flush 5-10 mL  5-10 mL IntraVENous Q8H    sodium chloride (NS) flush 5-10 mL  5-10 mL IntraVENous PRN    sodium chloride (NS) flush 5-40 mL  5-40 mL IntraVENous Q8H    sodium chloride (NS) flush 5-40 mL  5-40 mL IntraVENous PRN    acetaminophen (TYLENOL) tablet 650 mg  650 mg Oral Q6H PRN    Or    acetaminophen (TYLENOL) suppository 650 mg  650 mg Rectal Q6H PRN    polyethylene glycol (MIRALAX) packet 17 g  17 g Oral DAILY PRN    ondansetron (ZOFRAN ODT) tablet 4 mg  4 mg Oral Q8H PRN    Or    ondansetron (ZOFRAN) injection 4 mg  4 mg IntraVENous Q6H PRN    albuterol (PROVENTIL VENTOLIN) nebulizer solution 2.5 mg  2.5 mg Nebulization Q4H PRN    albuterol (PROVENTIL VENTOLIN) nebulizer solution 2.5 mg  2.5 mg Nebulization Q6H RT    amLODIPine (NORVASC) tablet 5 mg  5 mg Oral DAILY    labetaloL (NORMODYNE;TRANDATE) injection 10 mg  10 mg IntraVENous Q6H PRN       Discharge Info:   Current Discharge Medication List      START taking these medications    Details   amLODIPine (NORVASC) 5 mg tablet Take 2 Tablets by mouth daily for 30 days. Qty: 60 Tablet, Refills: 0  Start date: 11/29/2021, End date: 12/29/2021      predniSONE (DELTASONE) 20 mg tablet Take 40 mg by mouth daily (with breakfast) for 5 days. Qty: 10 Tablet, Refills: 0  Start date: 11/29/2021, End date: 12/4/2021      albuterol (PROVENTIL HFA, VENTOLIN HFA, PROAIR HFA) 90 mcg/actuation inhaler Take 1 Puff by inhalation every four (4) hours as needed for Wheezing, Shortness of Breath or Respiratory Distress for up to 30 days. Qty: 18 g, Refills: 0  Start date: 11/28/2021, End date: 12/28/2021      furosemide (LASIX) 40 mg tablet Take 1 Tablet by mouth daily for 30 days. Qty: 30 Tablet, Refills: 0  Start date: 11/28/2021, End date: 12/28/2021               Time spent in patient discharge planning and coordination 35 minutes.     Signed:  Stephanie Mullins MD

## 2021-11-28 NOTE — PROGRESS NOTES
cardene decreased to 1.5 mg and then stopped, BP improving, added prn labetalol, will wait 1 hr and see if BP remains stable to go to remote tele, holding lasix for now  Kirill Terrell MD

## 2021-11-28 NOTE — PROGRESS NOTES
Went over AVS with patient and sister. PIV removed. Cardiac monitor removed. Flu shot given in L arm. Denies questions. RX printoffs given to patient.

## 2021-11-28 NOTE — PROGRESS NOTES
Pt alert and oriented. Respirations are even and unlabored on RA. Remote cardiac monitoring in place. No signs of distress noted or needs expressed. Bed alarm on. Call light within reach. Will continue to monitor. SBAR to be given to oncoming nurse.

## 2021-11-28 NOTE — PROGRESS NOTES
There is a high probability of acute organ impairment or life-threatening deterioration in the patient's condition from: hypertensive emergency  Critical care interventions: IV cardene   Total critical care time spent: 45  minutes. Time is indicative of direct patient attendance at bedside and on the patient's floor nearby. Includes time spent at bedside performing history and exam, performing chart review, discussing findings and treatment plan with patient and/or family, discussing patient with consultants and colleagues, ordering and reviewing pertinent laboratory and radiographic evaluations, and discussing patient with nursing staff. Time excludes procedures.     Avinash Jimenez MD

## 2021-11-28 NOTE — PROGRESS NOTES
Problem: Falls - Risk of  Goal: *Absence of Falls  Description: Document Rajiv Lopez Fall Risk and appropriate interventions in the flowsheet.   11/28/2021 1043 by Clara hCisholm RN  Outcome: Resolved/Met  Note: Fall Risk Interventions:  Mobility Interventions: Bed/chair exit alarm         Medication Interventions: Evaluate medications/consider consulting pharmacy         History of Falls Interventions: Bed/chair exit alarm      11/28/2021 1025 by Clara Chisholm RN  Outcome: Progressing Towards Goal  Note: Fall Risk Interventions:  Mobility Interventions: Bed/chair exit alarm         Medication Interventions: Evaluate medications/consider consulting pharmacy         History of Falls Interventions: Bed/chair exit alarm         Problem: Patient Education: Go to Patient Education Activity  Goal: Patient/Family Education  Outcome: Resolved/Met     Problem: Patient Education: Go to Patient Education Activity  Goal: Patient/Family Education  Outcome: Resolved/Met

## 2022-09-20 ENCOUNTER — HOSPITAL ENCOUNTER (EMERGENCY)
Age: 72
Discharge: HOME | End: 2022-09-20
Attending: GENERAL PRACTICE
Payer: OTHER GOVERNMENT

## 2022-09-20 VITALS
HEIGHT: 73 IN | WEIGHT: 175 LBS | BODY MASS INDEX: 23.19 KG/M2 | OXYGEN SATURATION: 99 % | TEMPERATURE: 98.6 F | HEART RATE: 50 BPM | SYSTOLIC BLOOD PRESSURE: 151 MMHG | DIASTOLIC BLOOD PRESSURE: 93 MMHG | RESPIRATION RATE: 19 BRPM

## 2022-09-20 DIAGNOSIS — N39.0 URINARY TRACT INFECTION WITHOUT HEMATURIA, SITE UNSPECIFIED: Primary | ICD-10-CM

## 2022-09-20 LAB
APPEARANCE UR: ABNORMAL
BACTERIA URNS QL MICRO: NORMAL /HPF
BILIRUB UR QL: NEGATIVE
CASTS URNS QL MICRO: 0 /LPF
COLOR UR: ABNORMAL
CRYSTALS URNS QL MICRO: 0 /LPF
EPI CELLS #/AREA URNS HPF: 0 /HPF
GLUCOSE UR STRIP.AUTO-MCNC: NEGATIVE MG/DL
HGB UR QL STRIP: ABNORMAL
KETONES UR QL STRIP.AUTO: NEGATIVE MG/DL
LEUKOCYTE ESTERASE UR QL STRIP.AUTO: ABNORMAL
MUCOUS THREADS URNS QL MICRO: 0 /LPF
NITRITE UR QL STRIP.AUTO: NEGATIVE
OTHER OBSERVATIONS: NORMAL
PH UR STRIP: 6.5 [PH] (ref 5–9)
PROT UR STRIP-MCNC: 100 MG/DL
RBC #/AREA URNS HPF: >100 /HPF
SP GR UR REFRACTOMETRY: 1.02 (ref 1–1.02)
UROBILINOGEN UR QL STRIP.AUTO: 0.2 EU/DL (ref 0.2–1)
WBC URNS QL MICRO: NORMAL /HPF

## 2022-09-20 PROCEDURE — 81001 URINALYSIS AUTO W/SCOPE: CPT

## 2022-09-20 PROCEDURE — 99284 EMERGENCY DEPT VISIT MOD MDM: CPT

## 2022-09-20 PROCEDURE — 93005 ELECTROCARDIOGRAM TRACING: CPT | Performed by: GENERAL PRACTICE

## 2022-09-20 PROCEDURE — 81003 URINALYSIS AUTO W/O SCOPE: CPT

## 2022-09-20 RX ORDER — CEPHALEXIN 500 MG/1
500 CAPSULE ORAL 3 TIMES DAILY
Qty: 21 CAPSULE | Refills: 0 | Status: SHIPPED | OUTPATIENT
Start: 2022-09-20 | End: 2022-09-27

## 2022-09-20 ASSESSMENT — PAIN - FUNCTIONAL ASSESSMENT
PAIN_FUNCTIONAL_ASSESSMENT: NONE - DENIES PAIN
PAIN_FUNCTIONAL_ASSESSMENT: NONE - DENIES PAIN

## 2022-09-21 LAB
EKG ATRIAL RATE: 98 BPM
EKG DIAGNOSIS: NORMAL
EKG P AXIS: 72 DEGREES
EKG P-R INTERVAL: 170 MS
EKG Q-T INTERVAL: 408 MS
EKG QRS DURATION: 94 MS
EKG QTC CALCULATION (BAZETT): 520 MS
EKG R AXIS: -16 DEGREES
EKG T AXIS: 134 DEGREES
EKG VENTRICULAR RATE: 98 BPM

## 2022-09-21 NOTE — ED PROVIDER NOTES
Emergency Department Provider Note                   PCP:                No primary care provider on file. Age: 66 y.o. Sex: male     No diagnosis found. DISPOSITION          MDM  Number of Diagnoses or Management Options  Urinary tract infection without hematuria, site unspecified: new, needed workup  Diagnosis management comments: Patient presents with urinary changes and hematuria. However, when patient urinated here he states that it was clear. I did do a bedside ultrasound and patient did not have any significant bladder distention. There is evidence of infection in the urine but there is nothing to suggest upper tract infection or sepsis. Patient also does not have any abdominal pain. I believe it is reasonable to start treatment for UTI and patient is to follow-up with his primary care and return precautions are given. Amount and/or Complexity of Data Reviewed  Clinical lab tests: ordered and reviewed  Tests in the medicine section of CPT®: ordered and reviewed  Decide to obtain previous medical records or to obtain history from someone other than the patient: no  Review and summarize past medical records: no  Independent visualization of images, tracings, or specimens: no    Risk of Complications, Morbidity, and/or Mortality  Presenting problems: low  Diagnostic procedures: minimal  Management options: low    Patient Progress  Patient progress: stable             Orders Placed This Encounter   Procedures    Urinalysis w rflx microscopic    Urinalysis, Micro    EKG 12 Lead        Medications - No data to display    New Prescriptions    No medications on file        Terri Ha is a 66 y.o. male who presents to the Emergency Department with chief complaint of    Chief Complaint   Patient presents with    Urinary Retention      Patient presents with urinary complaints.   Patient states at times he has urgency and does not have control and then at times he feels like he cannot get it out. He does note blood in the urine and he has some mild pain with urination. He denies any fevers or vomiting or flank pain. Patient states he has had this problem in the past.  He denies any current retention at this time. Patient states he denies a fullness feeling and feels stable. Review of Systems   All other systems reviewed and are negative. Past Medical History:   Diagnosis Date    CAD (coronary artery disease)         Past Surgical History:   Procedure Laterality Date    CARDIAC SURGERY          History reviewed. No pertinent family history. Social History     Socioeconomic History    Marital status: Legally      Spouse name: None    Number of children: None    Years of education: None    Highest education level: None   Tobacco Use    Smoking status: Every Day     Packs/day: 0.25     Types: Cigarettes    Smokeless tobacco: Never   Substance and Sexual Activity    Alcohol use: Yes    Drug use: Never         Patient has no known allergies. Previous Medications    No medications on file        Vitals signs and nursing note reviewed. Patient Vitals for the past 4 hrs:   Temp Pulse Resp BP SpO2   09/20/22 1928 -- -- -- -- 99 %   09/20/22 1924 98.6 °F (37 °C) (!) 48 20 (!) 147/90 --          Physical Exam  Constitutional:       General: He is not in acute distress. HENT:      Head: Normocephalic and atraumatic. Right Ear: External ear normal.      Left Ear: External ear normal.      Nose: No congestion or rhinorrhea. Mouth/Throat:      Mouth: Mucous membranes are moist.   Eyes:      Extraocular Movements: Extraocular movements intact. Pupils: Pupils are equal, round, and reactive to light. Cardiovascular:      Rate and Rhythm: Normal rate and regular rhythm. Heart sounds: Normal heart sounds. Pulmonary:      Effort: Pulmonary effort is normal.      Breath sounds: Normal breath sounds. Abdominal:      General: There is no distension. Palpations: Abdomen is soft. Tenderness: There is no abdominal tenderness. Comments: No suprapubic tenderness or fullness at this time   Musculoskeletal:         General: No swelling or tenderness. Normal range of motion. Cervical back: Normal range of motion. Skin:     Findings: No rash. Neurological:      General: No focal deficit present. Mental Status: He is alert and oriented to person, place, and time. Psychiatric:         Mood and Affect: Mood normal.        Procedures    Results for orders placed or performed during the hospital encounter of 09/20/22   Urinalysis w rflx microscopic   Result Value Ref Range    Color, UA RED      Appearance TURBID      Specific Gravity, UA 1.025 (H) 1.001 - 1.023      pH, Urine 6.5 5.0 - 9.0      Protein,  (A) NEG mg/dL    Glucose, UA Negative NEG mg/dL    Ketones, Urine Negative NEG mg/dL    Bilirubin Urine Negative NEG      Blood, Urine LARGE (A) NEG      Urobilinogen, Urine 0.2 0.2 - 1.0 EU/dL    Nitrite, Urine Negative NEG      Leukocyte Esterase, Urine TRACE (A) NEG     Urinalysis, Micro   Result Value Ref Range    WBC, UA 20-50 0 /hpf    RBC, UA >100 0 /hpf    Epithelial Cells UA 0 0 /hpf    BACTERIA, URINE TRACE 0 /hpf    Casts 0 0 /lpf    Crystals 0 0 /LPF    Mucus, UA 0 0 /lpf    OTHER OBSERVATIONS RESULTS VERIFIED MANUALLY     EKG 12 Lead   Result Value Ref Range    Ventricular Rate 98 BPM    Atrial Rate 98 BPM    P-R Interval 170 ms    QRS Duration 94 ms    Q-T Interval 408 ms    QTc Calculation (Bazett) 520 ms    P Axis 72 degrees    R Axis -16 degrees    T Axis 134 degrees    Diagnosis       Sinus rhythm with Premature supraventricular complexes and with frequent   Premature ventricular complexes          No orders to display                       Voice dictation software was used during the making of this note. This software is not perfect and grammatical and other typographical errors may be present.   This note has not been completely proofread for errors.      Lewis Hogan,   09/20/22 [id number]

## 2022-09-21 NOTE — ED NOTES
I have reviewed discharge instructions with the patient. The patient verbalized understanding. Patient left ED via Discharge Method: ambulatory to Home with self. Opportunity for questions and clarification provided. Patient given 1 scripts. To continue your aftercare when you leave the hospital, you may receive an automated call from our care team to check in on how you are doing. This is a free service and part of our promise to provide the best care and service to meet your aftercare needs.  If you have questions, or wish to unsubscribe from this service please call [phone]. Thank you for Choosing our [facility] Emergency Department.           Vanda Hough RN  09/20/22 [id number]

## 2022-09-21 NOTE — ED NOTES
I have reviewed discharge instructions with the patient. The patient verbalized understanding. Patient left ED via Discharge Method: ambulatory to Home with self    Opportunity for questions and clarification provided. Patient given 0 scripts. To continue your aftercare when you leave the hospital, you may receive an automated call from our care team to check in on how you are doing. This is a free service and part of our promise to provide the best care and service to meet your aftercare needs.  If you have questions, or wish to unsubscribe from this service please call [phone]. Thank you for Choosing our [facility] Emergency Department.         Allison Marin RN  09/20/22 [id number]